# Patient Record
Sex: FEMALE | Race: WHITE | NOT HISPANIC OR LATINO | URBAN - NONMETROPOLITAN AREA
[De-identification: names, ages, dates, MRNs, and addresses within clinical notes are randomized per-mention and may not be internally consistent; named-entity substitution may affect disease eponyms.]

---

## 2018-08-02 PROBLEM — H25.13: Noted: 2018-08-02

## 2018-08-02 PROBLEM — H16.223: Noted: 2018-08-02

## 2018-08-02 PROBLEM — G43.B1: Noted: 2018-02-09

## 2018-08-02 PROBLEM — H52.4: Noted: 2018-08-02

## 2018-08-02 PROBLEM — H52.13: Noted: 2018-08-02

## 2018-08-02 PROBLEM — H43.813: Noted: 2018-02-09

## 2019-08-09 ENCOUNTER — IMPORTED ENCOUNTER (OUTPATIENT)
Dept: URBAN - NONMETROPOLITAN AREA CLINIC 1 | Facility: CLINIC | Age: 54
End: 2019-08-09

## 2019-08-09 PROCEDURE — 92310 CONTACT LENS FITTING OU: CPT

## 2019-08-09 PROCEDURE — 92015 DETERMINE REFRACTIVE STATE: CPT

## 2019-08-09 PROCEDURE — 92014 COMPRE OPH EXAM EST PT 1/>: CPT

## 2019-08-09 NOTE — PATIENT DISCUSSION
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 -  Oculus 5M done previously:Tear Meniscus OU: lowTBUT: OD 3.82 OS 3. 02Patient has incomplete blinks but good meibomian glands-  Patient reports recent diagnosis of possible Sjogren's Dz but testing to confirm has not been completed yet. -  Continue Systane or Refresh OU QID+ PRN. -  Recommended GenTeal Gel OU QHS last visit but not currently using. -  Hx of Pred Forte use but patient states once she starts to taper off her symptoms flare back up. However I do not want her to use this long term due to side effects. -  Continue Xiidra OU QHS -  Continue Alrex BID -  Continue to monitor  Ocular Allergies OU-  Discussed diagnosis in detail with patient -  Asymptomatic at this time. -  Continue Optivar OU BID PRN. -  Continue to monitor PVD OU: -  Discussed findings of exam in detail with the patient.-  No holes/tear/detachments noted on exam today flat 360 OU. -  The risk of retinal detachment in patients with PVDs was discussed with the patient and the warning signs of retinal detachment were carefully reviewed with the patient. -  The patient was warned to return to the office or contact the ophthalmologist on call immediately if they experience signs of retinal detachment or changes noted in vision from today. -  Continue to monitor Note: Patient is currently on a Lung transplant list; 's Notes: MR  7/31/2017DFEOCTOptos 6/10/14DES Tx________________BlephExOculus 3/1/2017PlugsAlrex OU BID - [id number] retry 3/2018 due to dryness and not wanting to use Pred long termOptivar OU BID - 2015-8/2016Refresh PF OU TID - 8/2016 to nowPred Forte gtts x 4 weeks - 2/2016

## 2020-08-25 ENCOUNTER — IMPORTED ENCOUNTER (OUTPATIENT)
Dept: URBAN - NONMETROPOLITAN AREA CLINIC 1 | Facility: CLINIC | Age: 55
End: 2020-08-25

## 2020-08-25 PROBLEM — H16.223: Noted: 2020-08-25

## 2020-08-25 PROBLEM — H52.4: Noted: 2020-10-14

## 2020-08-25 PROBLEM — H16.223: Noted: 2020-10-14

## 2020-08-25 PROBLEM — H52.4: Noted: 2020-08-25

## 2020-08-25 PROBLEM — H43.813: Noted: 2020-08-25

## 2020-08-25 PROBLEM — H25.813: Noted: 2020-08-25

## 2020-08-25 PROBLEM — G43.B1: Noted: 2020-08-25

## 2020-08-25 PROBLEM — H25.813: Noted: 2020-10-14

## 2020-08-25 PROCEDURE — 92310 CONTACT LENS FITTING OU: CPT

## 2020-08-25 PROCEDURE — S0621 ROUTINE OPHTHALMOLOGICAL EXA: HCPCS

## 2020-08-25 NOTE — PATIENT DISCUSSION
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 -  Oculus 5M done previously:Tear Meniscus OU: lowTBUT: OD 3.82 OS 3. 02Patient has incomplete blinks but good meibomian glands-  Patient reports recent diagnosis of possible Sjogren's Dz but testing to confirm has not been completed yet. -  Continue Systane or Refresh OU QID+ PRN. -  Recommended GenTeal Gel OU QHS last visit but not currently using. -  Hx of Pred Forte use but patient states once she starts to taper off her symptoms flare back up. However I do not want her to use this long term due to side effects. -  Continue Xiidra OU QHS -  Continue to monitor  Ocular Allergies OU-  Discussed diagnosis in detail with patient -  Asymptomatic at this time. -  Continue Optivar OU BID PRN. -  Continue to monitor PVD OU: -  Discussed findings of exam in detail with the patient.-  No holes/tear/detachments noted on exam today flat 360 OU. -  The risk of retinal detachment in patients with PVDs was discussed with the patient and the warning signs of retinal detachment were carefully reviewed with the patient. -  The patient was warned to return to the office or contact the ophthalmologist on call immediately if they experience signs of retinal detachment or changes noted in vision from today. -  Continue to monitor Note: Patient is currently on a Lung transplant list; 's Notes: MR  7/31/2017DFEOCTOptos 6/10/14DES Tx________________BlephExOculus 3/1/2017PlugsAlrex OU BID - [id number] retry 3/2018 due to dryness and not wanting to use Pred long termOptivar OU BID - 2015-8/2016Refresh PF OU TID - 8/2016 to nowPred Forte gtts x 4 weeks - 2/2016

## 2020-08-31 ENCOUNTER — IMPORTED ENCOUNTER (OUTPATIENT)
Dept: URBAN - NONMETROPOLITAN AREA CLINIC 1 | Facility: CLINIC | Age: 55
End: 2020-08-31

## 2020-08-31 NOTE — PATIENT DISCUSSION
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 -  Oculus 5M done previously:Tear Meniscus OU: lowTBUT: OD 3.82 OS 3. 02Patient has incomplete blinks but good meibomian glands-  Patient reports recent diagnosis of possible Sjogren's Dz but testing to confirm has not been completed yet. -  Continue Systane or Refresh OU QID+ PRN. -  Recommended GenTeal Gel OU QHS last visit but not currently using. -  Hx of Pred Forte use but patient states once she starts to taper off her symptoms flare back up. However I do not want her to use this long term due to side effects. -  Continue Xiidra OU QHS -  Continue to monitor  Ocular Allergies OU-  Discussed diagnosis in detail with patient -  Asymptomatic at this time. -  Continue Optivar OU BID PRN. -  Continue to monitor PVD OU: -  Discussed findings of exam in detail with the patient.-  No holes/tear/detachments noted on exam today flat 360 OU. -  The risk of retinal detachment in patients with PVDs was discussed with the patient and the warning signs of retinal detachment were carefully reviewed with the patient. -  The patient was warned to return to the office or contact the ophthalmologist on call immediately if they experience signs of retinal detachment or changes noted in vision from today. -  Continue to monitor Note: Patient is currently on a Lung transplant list; 's Notes: MR  7/31/2017DFEOCTOptos 6/10/14DES Tx________________BlephExOculus 3/1/2017PlugsAlrex OU BID - [id number] retry 3/2018 due to dryness and not wanting to use Pred long termOptivar OU BID - 2015-8/2016Refresh PF OU TID - 8/2016 to nowPred Forte gtts x 4 weeks - 2/2016

## 2020-09-09 ENCOUNTER — IMPORTED ENCOUNTER (OUTPATIENT)
Dept: URBAN - NONMETROPOLITAN AREA CLINIC 1 | Facility: CLINIC | Age: 55
End: 2020-09-09

## 2020-09-09 NOTE — PATIENT DISCUSSION
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 -  Oculus 5M done previously:Tear Meniscus OU: lowTBUT: OD 3.82 OS 3. 02Patient has incomplete blinks but good meibomian glands-  Patient reports recent diagnosis of possible Sjogren's Dz but testing to confirm has not been completed yet. -  Continue Systane or Refresh OU QID+ PRN. -  Recommended GenTeal Gel OU QHS last visit but not currently using. -  Hx of Pred Forte use but patient states once she starts to taper off her symptoms flare back up. However I do not want her to use this long term due to side effects. -  Continue Xiidra OU QHS -  Continue to monitor  Ocular Allergies OU-  Discussed diagnosis in detail with patient -  Asymptomatic at this time. -  Continue Optivar OU BID PRN. -  Continue to monitor PVD OU: -  Discussed findings of exam in detail with the patient.-  No holes/tear/detachments noted on exam today flat 360 OU. -  The risk of retinal detachment in patients with PVDs was discussed with the patient and the warning signs of retinal detachment were carefully reviewed with the patient. -  The patient was warned to return to the office or contact the ophthalmologist on call immediately if they experience signs of retinal detachment or changes noted in vision from today. -  Continue to monitor Note: Patient is currently on a Lung transplant list; 's Notes: MR  7/31/2017DFEOCTOptos 6/10/14DES Tx________________BlephExOculus 3/1/2017PlugsAlrex OU BID - [id number] retry 3/2018 due to dryness and not wanting to use Pred long termOptivar OU BID - 2015-8/2016Refresh PF OU TID - 8/2016 to nowPred Forte gtts x 4 weeks - 2/2016

## 2020-09-16 ENCOUNTER — IMPORTED ENCOUNTER (OUTPATIENT)
Dept: URBAN - NONMETROPOLITAN AREA CLINIC 1 | Facility: CLINIC | Age: 55
End: 2020-09-16

## 2020-09-16 NOTE — PATIENT DISCUSSION
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 -  Oculus 5M done previously:Tear Meniscus OU: lowTBUT: OD 3.82 OS 3. 02Patient has incomplete blinks but good meibomian glands-  Patient reports recent diagnosis of possible Sjogren's Dz but testing to confirm has not been completed yet. -  Continue Systane or Refresh OU QID+ PRN. -  Recommended GenTeal Gel OU QHS last visit but not currently using. -  Hx of Pred Forte use but patient states once she starts to taper off her symptoms flare back up. However I do not want her to use this long term due to side effects. -  Continue Xiidra OU QHS -  Continue to monitor  Ocular Allergies OU-  Discussed diagnosis in detail with patient -  Asymptomatic at this time. -  Continue Optivar OU BID PRN. -  Continue to monitor PVD OU: -  Discussed findings of exam in detail with the patient.-  No holes/tear/detachments noted on exam today flat 360 OU. -  The risk of retinal detachment in patients with PVDs was discussed with the patient and the warning signs of retinal detachment were carefully reviewed with the patient. -  The patient was warned to return to the office or contact the ophthalmologist on call immediately if they experience signs of retinal detachment or changes noted in vision from today. -  Continue to monitor Note: Patient is currently on a Lung transplant list; 's Notes: MR  7/31/2017DFEOCTOptos 6/10/14DES Tx________________BlephExOculus 3/1/2017PlugsAlrex OU BID - [id number] retry 3/2018 due to dryness and not wanting to use Pred long termOptivar OU BID - 2015-8/2016Refresh PF OU TID - 8/2016 to nowPred Forte gtts x 4 weeks - 2/2016

## 2020-09-23 ENCOUNTER — IMPORTED ENCOUNTER (OUTPATIENT)
Dept: URBAN - NONMETROPOLITAN AREA CLINIC 1 | Facility: CLINIC | Age: 55
End: 2020-09-23

## 2020-09-23 NOTE — PATIENT DISCUSSION
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 -  Oculus 5M done previously:Tear Meniscus OU: lowTBUT: OD 3.82 OS 3. 02Patient has incomplete blinks but good meibomian glands-  Patient reports recent diagnosis of possible Sjogren's Dz but testing to confirm has not been completed yet. -  Continue Systane or Refresh OU QID+ PRN. -  Recommended GenTeal Gel OU QHS last visit but not currently using. -  Hx of Pred Forte use but patient states once she starts to taper off her symptoms flare back up. However I do not want her to use this long term due to side effects. -  Continue Xiidra OU QHS -  Continue to monitor  Ocular Allergies OU-  Discussed diagnosis in detail with patient -  Asymptomatic at this time. -  Continue Optivar OU BID PRN. -  Continue to monitor PVD OU: -  Discussed findings of exam in detail with the patient.-  No holes/tear/detachments noted on exam today flat 360 OU. -  The risk of retinal detachment in patients with PVDs was discussed with the patient and the warning signs of retinal detachment were carefully reviewed with the patient. -  The patient was warned to return to the office or contact the ophthalmologist on call immediately if they experience signs of retinal detachment or changes noted in vision from today. -  Continue to monitor Note: Patient is currently on a Lung transplant list; 's Notes: MR  7/31/2017DFEOCTOptos 6/10/14DES Tx________________BlephExOculus 3/1/2017PlugsAlrex OU BID - [id number] retry 3/2018 due to dryness and not wanting to use Pred long termOptivar OU BID - 2015-8/2016Refresh PF OU TID - 8/2016 to nowPred Forte gtts x 4 weeks - 2/2016 povidone iodine (if allergic to chlorhexidine)

## 2020-09-30 ENCOUNTER — IMPORTED ENCOUNTER (OUTPATIENT)
Dept: URBAN - NONMETROPOLITAN AREA CLINIC 1 | Facility: CLINIC | Age: 55
End: 2020-09-30

## 2020-09-30 NOTE — PATIENT DISCUSSION
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 -  Oculus 5M done previously:Tear Meniscus OU: lowTBUT: OD 3.82 OS 3. 02Patient has incomplete blinks but good meibomian glands-  Patient reports recent diagnosis of possible Sjogren's Dz but testing to confirm has not been completed yet. -  Continue Systane or Refresh OU QID+ PRN. -  Recommended GenTeal Gel OU QHS last visit but not currently using. -  Hx of Pred Forte use but patient states once she starts to taper off her symptoms flare back up. However I do not want her to use this long term due to side effects. -  Continue Xiidra OU QHS -  Continue to monitor  Ocular Allergies OU-  Discussed diagnosis in detail with patient -  Asymptomatic at this time. -  Continue Optivar OU BID PRN. -  Continue to monitor PVD OU: -  Discussed findings of exam in detail with the patient.-  No holes/tear/detachments noted on exam today flat 360 OU. -  The risk of retinal detachment in patients with PVDs was discussed with the patient and the warning signs of retinal detachment were carefully reviewed with the patient. -  The patient was warned to return to the office or contact the ophthalmologist on call immediately if they experience signs of retinal detachment or changes noted in vision from today. -  Continue to monitor Note: Patient is currently on a Lung transplant list; 's Notes: MR  7/31/2017DFEOCTOptos 6/10/14DES Tx________________BlephExOculus 3/1/2017PlugsAlrex OU BID - [id number] retry 3/2018 due to dryness and not wanting to use Pred long termOptivar OU BID - 2015-8/2016Refresh PF OU TID - 8/2016 to nowPred Forte gtts x 4 weeks - 2/2016

## 2020-10-14 ENCOUNTER — IMPORTED ENCOUNTER (OUTPATIENT)
Dept: URBAN - NONMETROPOLITAN AREA CLINIC 1 | Facility: CLINIC | Age: 55
End: 2020-10-14

## 2020-10-14 PROCEDURE — 99213 OFFICE O/P EST LOW 20 MIN: CPT

## 2020-10-14 NOTE — PATIENT DISCUSSION
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 - Oculus 5M done previously:Tear Meniscus OU: lowTBUT: OD 3.82 OS 3. 02Patient has incomplete blinks but good meibomian glands- Patient reports recent diagnosis of possible Sjogren's Dz but testing to confirm has not been completed yet. - Continue Systane or Refresh OU QID+ PRN. - Recommended GenTeal Gel OU QHS last visit but not currently using. - Hx of Pred Forte use but patient states once she starts to taper off her symptoms flare back up. However I do not want her to use this long term due to side effects. - Continue Xiidra OU QHS - D/C CLS - Start Alrex TID OU x 1 week- Corneal Topography done today and review with patient  - Continue to monitor  Myopia / Presbyopia - Discussed diagnosis in detail with patient - New glasses RX given today and will have patient work with Oh Velasco with CLS - Continue to monitorCataracts OU early signs: -  Discussed diagnosis in detail with patient-  Discussed signs and symptoms associated-  Recommend UV protection -  Progression noted today OU-  Continue to monitor Ocular Allergies OU-  Discussed diagnosis in detail with patient -  Asymptomatic at this time. -  Continue Optivar OU BID PRN. -  Continue to monitor PVD OU: -  Discussed findings of exam in detail with the patient.-  No holes/tear/detachments noted on exam today flat 360 OU. -  The risk of retinal detachment in patients with PVDs was discussed with the patient and the warning signs of retinal detachment were carefully reviewed with the patient. -  The patient was warned to return to the office or contact the ophthalmologist on call immediately if they experience signs of retinal detachment or changes noted in vision from today. -  Continue to monitor Note: Patient is currently on a Lung transplant list; 's Notes: MR  7/31/2017DFEOCTOptos 6/10/14DES Tx________________BlephExOculus 3/1/2017PlugsAlrex OU BID - [id number] retry 3/2018 due to dryness and not wanting to use Pred long termOptivar OU BID - 2015-8/2016Refresh PF OU TID - 8/2016 to nowPred Forte gtts x 4 weeks - 2/2016

## 2020-10-19 ENCOUNTER — IMPORTED ENCOUNTER (OUTPATIENT)
Dept: URBAN - NONMETROPOLITAN AREA CLINIC 1 | Facility: CLINIC | Age: 55
End: 2020-10-19

## 2020-10-19 NOTE — PATIENT DISCUSSION
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 - Oculus 5M done previously:Tear Meniscus OU: lowTBUT: OD 3.82 OS 3. 02Patient has incomplete blinks but good meibomian glands- Patient reports recent diagnosis of possible Sjogren's Dz but testing to confirm has not been completed yet. - Continue Systane or Refresh OU QID+ PRN. - Recommended GenTeal Gel OU QHS last visit but not currently using. - Hx of Pred Forte use but patient states once she starts to taper off her symptoms flare back up. However I do not want her to use this long term due to side effects. - Continue Xiidra OU QHS - D/C CLS - Start Alrex TID OU x 1 week- Corneal Topography done today and review with patient  - Continue to monitor  Myopia / Presbyopia - Discussed diagnosis in detail with patient - New glasses RX given today and will have patient work with Cl Diogenes with CLS - Continue to monitorCataracts OU early signs: -  Discussed diagnosis in detail with patient-  Discussed signs and symptoms associated-  Recommend UV protection -  Progression noted today OU-  Continue to monitor Ocular Allergies OU-  Discussed diagnosis in detail with patient -  Asymptomatic at this time. -  Continue Optivar OU BID PRN. -  Continue to monitor PVD OU: -  Discussed findings of exam in detail with the patient.-  No holes/tear/detachments noted on exam today flat 360 OU. -  The risk of retinal detachment in patients with PVDs was discussed with the patient and the warning signs of retinal detachment were carefully reviewed with the patient. -  The patient was warned to return to the office or contact the ophthalmologist on call immediately if they experience signs of retinal detachment or changes noted in vision from today. -  Continue to monitor Note: Patient is currently on a Lung transplant list; 's Notes: MR  7/31/2017DFEOCTOptos 6/10/14DES Tx________________BlephExOculus 3/1/2017PlugsAlrex OU BID - [id number] retry 3/2018 due to dryness and not wanting to use Pred long termOptivar OU BID - 2015-8/2016Refresh PF OU TID - 8/2016 to nowPred Forte gtts x 4 weeks - 2/2016

## 2020-12-15 ENCOUNTER — IMPORTED ENCOUNTER (OUTPATIENT)
Dept: URBAN - NONMETROPOLITAN AREA CLINIC 1 | Facility: CLINIC | Age: 55
End: 2020-12-15

## 2020-12-15 PROBLEM — H43.813: Noted: 2020-12-15

## 2020-12-15 PROBLEM — H25.813: Noted: 2020-12-15

## 2020-12-15 PROBLEM — H16.223: Noted: 2020-12-15

## 2020-12-15 PROBLEM — G43.B1: Noted: 2020-12-15

## 2020-12-15 PROBLEM — H52.4: Noted: 2020-12-15

## 2020-12-15 PROCEDURE — 99214 OFFICE O/P EST MOD 30 MIN: CPT

## 2020-12-15 PROCEDURE — 92250 FUNDUS PHOTOGRAPHY W/I&R: CPT

## 2020-12-15 NOTE — PATIENT DISCUSSION
PVD OU (OS new finding)- Discussed findings of exam in detail with the patient. - The risk of retinal detachment in patients with PVDs was discussed with the patient and the warning signs of retinal detachment were carefully reviewed with the patient. - The patient was warned to return to the office or contact the ophthalmologist on call immediately if they experience signs of retinal detachment. 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 - Oculus 5M done previously:Tear Meniscus OU: lowTBUT: OD 3.82 OS 3. 02Patient has incomplete blinks but good meibomian glands- Patient reports recent diagnosis of possible Sjogren's Dz but testing to confirm has not been completed yet. - Continue Systane or Refresh OU QID+ PRN. - Recommended GenTeal Gel OU QHS last visit but not currently using. - Hx of Pred Forte use but patient states once she starts to taper off her symptoms flare back up. However I do not want her to use this long term due to side effects. - Continue Xiidra OU QHS - D/C CLS - Start Alrex TID OU x 1 week- Corneal Topography done today and review with patient  - Continue to monitor  Myopia / Presbyopia - Discussed diagnosis in detail with patient - New glasses RX given today and will have patient work with [place] with CLS - Continue to monitorCataracts OU early signs: -  Discussed diagnosis in detail with patient-  Discussed signs and symptoms associated-  Recommend UV protection -  Progression noted today OU-  Continue to monitor Ocular Allergies OU-  Discussed diagnosis in detail with patient -  Asymptomatic at this time. -  Continue Optivar OU BID PRN.  -  Continue to monitor Note: Patient is currently on a Lung transplant list; 's Notes: MR  7/31/2017DFEOCTOptos 6/10/14DES Tx________________BlephExOculus 3/1/2017PlugsAlrex OU BID - [id number] retry 3/2018 due to dryness and not wanting to use Pred long termOptivar OU BID - 2015-8/2016Refresh PF OU TID - 8/2016 to nowPred Forte gtts x 4 weeks - 2/2016

## 2021-02-08 ENCOUNTER — IMPORTED ENCOUNTER (OUTPATIENT)
Dept: URBAN - NONMETROPOLITAN AREA CLINIC 1 | Facility: CLINIC | Age: 56
End: 2021-02-08

## 2021-02-08 PROCEDURE — 99214 OFFICE O/P EST MOD 30 MIN: CPT

## 2021-02-08 PROCEDURE — 92250 FUNDUS PHOTOGRAPHY W/I&R: CPT

## 2021-02-08 NOTE — PATIENT DISCUSSION
PVD OU - Discussed findings of exam in detail with the patient. - The risk of retinal detachment in patients with PVDs was discussed with the patient and the warning signs of retinal detachment were carefully reviewed with the patient. - The patient was warned to return to the office or contact the ophthalmologist on call immediately if they experience signs of retinal detachment. - Optos done today no holes tears or detachments. 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 - Oculus 5M done previously:Tear Meniscus OU: lowTBUT: OD 3.82 OS 3. 02Patient has incomplete blinks but good meibomian glands- Patient reports recent diagnosis of possible Sjogren's Dz but testing to confirm has not been completed yet. - Continue Systane or Refresh OU QID+ PRN. - Recommended GenTeal Gel OU QHS last visit but not currently using. - Hx of Pred Forte use but patient states once she starts to taper off her symptoms flare back up. However I do not want her to use this long term due to side effects. - Continue Xiidra OU QHS - D/C CLS - Start Alrex TID OU x 1 week- Corneal Topography done today and review with patient  - Continue to monitor  Myopia / Presbyopia - Discussed diagnosis in detail with patient - New glasses RX given today and will have patient work with Jessie Arroyo with CLS - Continue to monitorCataracts OU early signs: -  Discussed diagnosis in detail with patient-  Discussed signs and symptoms associated-  Recommend UV protection -  Progression noted today OU-  Continue to monitor Ocular Allergies OU-  Discussed diagnosis in detail with patient -  Asymptomatic at this time. -  Continue Optivar OU BID PRN.  -  Continue to monitor Note: Patient is currently on a Lung transplant list; 's Notes: MR  7/31/2017DFEOCTOptos 2/8/21DES Tx________________BlephExOculus 3/1/2017PlugsAlrex OU BID - [id number] retry 3/2018 due to dryness and not wanting to use Pred long termOptivar OU BID - 2015-8/2016Refresh PF OU TID - 8/2016 to nowPred Forte gtts x 4 weeks - 2/2016

## 2021-08-26 ENCOUNTER — IMPORTED ENCOUNTER (OUTPATIENT)
Dept: URBAN - NONMETROPOLITAN AREA CLINIC 1 | Facility: CLINIC | Age: 56
End: 2021-08-26

## 2021-08-26 PROBLEM — H52.4: Noted: 2021-08-26

## 2021-08-26 PROBLEM — H16.223: Noted: 2021-08-26

## 2021-08-26 PROBLEM — H25.813: Noted: 2021-08-26

## 2021-08-26 PROBLEM — H43.813: Noted: 2021-08-26

## 2021-08-26 PROBLEM — G43.B1: Noted: 2020-12-15

## 2021-08-26 PROCEDURE — 92015 DETERMINE REFRACTIVE STATE: CPT

## 2021-08-26 PROCEDURE — 92310 CONTACT LENS FITTING OU: CPT

## 2021-08-26 PROCEDURE — 92014 COMPRE OPH EXAM EST PT 1/>: CPT

## 2021-08-26 NOTE — PATIENT DISCUSSION
Myopia / Presbyopia - Discussed diagnosis in detail with patient - New glasses and CL RX given today will speak to Nicole to see if their is anything we can do with CLS to improve vision  - Continue to monitorCataracts OU early signs: - Discussed diagnosis in detail with patient- Discussed signs and symptoms associated- Recommend UV protection - Progression noted today OU- Continue to monitor PVD OU - Discussed findings of exam in detail with the patient. - The risk of retinal detachment in patients with PVDs was discussed with the patient and the warning signs of retinal detachment were carefully reviewed with the patient. - The patient was warned to return to the office or contact the ophthalmologist on call immediately if they experience signs of retinal detachment. - Optos done previously no holes tears or detachments. - Continue to monitorSuperficial Punctate Keratitis/Dry Eyes OU (OD>OS): with Corneal Edema - Discussed diagnosis in detail with patient- Signs/symptoms associated discussed along with FB sensation. - Oculus 5M done previously:Tear Meniscus OU: lowTBUT: OD 3.82 OS 3. 02Patient has incomplete blinks but good meibomian glands- Patient reports recent diagnosis of possible Sjogren's Dz but testing to confirm has not been completed yet. - Continue Systane or Refresh OU QID+ PRN. - Recommended GenTeal Gel OU QHS last visit but not currently using. - Hx of Pred Forte use but patient states once she starts to taper off her symptoms flare back up. However I do not want her to use this long term due to side effects. - Continue Xiidra OU QHS - Corneal Topography done previously and review with patient  - Continue to monitor  Ocular Allergies OU-  Discussed diagnosis in detail with patient -  Asymptomatic at this time. -  Continue Optivar OU BID PRN.  -  Continue to monitor Note: Patient is currently on a Lung transplant list; 's Notes: MR  7/31/2017DFEOCTOptos 2/8/21DES Tx________________BlephExOculus 3/1/2017PlugsAlrex OU BID - 2260-6811 retry 3/2018 due to dryness and not wanting to use Pred long termOptivar OU BID - 2015-8/2016Refresh PF OU TID - 8/2016 to nowPred Forte gtts x 4 weeks - 2/2016

## 2022-04-09 ASSESSMENT — VISUAL ACUITY
OS_SC: 20/30
OS_SC: 20/30
OD_SC: J1
OD_SC: J1
OS_GLARE: 20/20
OS_SC: 20/30
OS_SC: J1
OS_SC: J1
OD_SC: 20/30-1
OS_SC: 20/29-
OS_SC: J1
OS_SC: 20/30
OS_CC: J1
OS_SC: 20/25
OS_SC: J1
OS_SC: J1
OS_SC: 20/20
OS_SC: J1
OD_SC: 20/22+1
OD_SC: 20/30-1
OD_SC: 20/30-1
OS_SC: 20/30
OD_SC: J1
OD_SC: 20/30-1
OD_SC: 20/20
OS_SC: 20/30
OD_SC: 20/30-1
OD_SC: 20/22-1
OD_SC: J1
OD_SC: 20/22-1
OD_SC: J1
OS_SC: 20/30
OS_SC: J1
OD_SC: 20/30-1
OD_SC: 20/30-1
OS_SC: 20/25+1
OD_CC: J1
OS_SC: 20/30
OD_SC: J1
OD_SC: 20/25
OS_SC: J1
OS_SC: 20/29-2
OS_SC: J1
OD_GLARE: 20/25
OD_SC: 20/30-1
OD_SC: J1
OU_CC: 20/30

## 2022-04-09 ASSESSMENT — TONOMETRY
OS_IOP_MMHG: 14
OD_IOP_MMHG: 17
OD_IOP_MMHG: 15
OS_IOP_MMHG: 14
OD_IOP_MMHG: 17
OS_IOP_MMHG: 17
OS_IOP_MMHG: 15
OS_IOP_MMHG: 15
OD_IOP_MMHG: 15
OD_IOP_MMHG: 14
OD_IOP_MMHG: 15
OS_IOP_MMHG: 17

## 2022-08-29 ENCOUNTER — ESTABLISHED PATIENT (OUTPATIENT)
Dept: URBAN - NONMETROPOLITAN AREA CLINIC 1 | Facility: CLINIC | Age: 57
End: 2022-08-29

## 2022-08-29 DIAGNOSIS — H52.4: ICD-10-CM

## 2022-08-29 PROCEDURE — 92310-E CONTACT LENS FITTING ESTABLISH PATIENT

## 2022-08-29 PROCEDURE — S0621 ROUTINE OPHTHALMOLOGICAL EXA: HCPCS

## 2022-08-29 ASSESSMENT — VISUAL ACUITY
OS_CC: 20/40
OS_PH: 20/20-2
OD_PH: 20/20-2
OD_CC: 20/40+2

## 2022-08-29 ASSESSMENT — TONOMETRY
OS_IOP_MMHG: 15
OD_IOP_MMHG: 13

## 2022-08-29 NOTE — PATIENT DISCUSSION
Discussed diagnosis in detail with patient- Signs/symptoms associated discussed along with FB sensation. Oculus 5M done previously:Tear Meniscus OU: low TBUT: OD 3.82 OS 3. 02Patient has incomplete blinks but good meibomian glands. Patient reports recent diagnosis of possible Sjogren's Dz but testing to confirm has not been completed yet. Continue Systane or Refresh OU QID+ PRN. Recommended GenTeal Gel OU QHS last visit but not currently using. Hx of Pred Forte use but patient states once she starts to taper off her symptoms flare back up. However I do not want her to use this long term due to side effects. Continue Xiidra OU QHS. Corneal Topography done previously and review with patient. Continue to monitor Ocular Allergies OU- Discussed diagnosis in detail with patient. Asymptomatic at this time. Continue Optivar OU BID PRN. Continue to monitor.

## 2022-10-28 ENCOUNTER — CONTACT LENSES/GLASSES VISIT (OUTPATIENT)
Dept: URBAN - NONMETROPOLITAN AREA CLINIC 1 | Facility: CLINIC | Age: 57
End: 2022-10-28

## 2022-10-28 DIAGNOSIS — H52.4: ICD-10-CM

## 2022-10-28 PROCEDURE — 92310-16 CONTACT LENS FITTING- 175

## 2022-10-28 ASSESSMENT — VISUAL ACUITY
OD_PH: 20/30
OS_PH: 20/25-1
OD_CC: 20/60-2
OS_CC: 20/200+2

## 2022-10-28 NOTE — PATIENT DISCUSSION
"CL trials were given last visit, but she comes in today stating that optical gave her another trial of -1.50 OD and she has not been seeing well. She says that optical initially ordered the ""wrong box"" and she had them send it back and got another trial. She did not bring CL boxes in with her today for me to verify what she is actually wearing today. I wonder if she has gotten her lenses in the wrong eye/mixed up. "

## 2022-10-28 NOTE — PATIENT DISCUSSION
New CL trials given today of OS at -3.50 OS. She is to call/text me with the OD information so I have the correct Rx in the computer system.

## 2022-10-28 NOTE — PATIENT DISCUSSION
I have repeated MR myself today and noticed a complete change in GLRx. Went over in detail w/ pt today. She has recently had a an increase in her PYRIDOSTIGMINE BROMIDE from 60mg QID to 180mg QID. Unknown if this is related or not. After detailed slit lamp exam again today I noticed an increase in her cataract growth.

## 2023-08-29 ENCOUNTER — ESTABLISHED PATIENT (OUTPATIENT)
Dept: URBAN - NONMETROPOLITAN AREA CLINIC 1 | Facility: CLINIC | Age: 58
End: 2023-08-29

## 2023-08-29 DIAGNOSIS — H16.223: ICD-10-CM

## 2023-08-29 DIAGNOSIS — H25.813: ICD-10-CM

## 2023-08-29 DIAGNOSIS — H43.813: ICD-10-CM

## 2023-08-29 DIAGNOSIS — H52.4: ICD-10-CM

## 2023-08-29 DIAGNOSIS — G43.B0: ICD-10-CM

## 2023-08-29 DIAGNOSIS — H35.372: ICD-10-CM

## 2023-08-29 PROCEDURE — 92499OP2 OPTOMAP RETINAL SCREENING BOTH EYES

## 2023-08-29 PROCEDURE — 92310-E CONTACT LENS FITTING ESTABLISH PATIENT

## 2023-08-29 PROCEDURE — 92014 COMPRE OPH EXAM EST PT 1/>: CPT

## 2023-08-29 PROCEDURE — 92015 DETERMINE REFRACTIVE STATE: CPT

## 2023-08-29 ASSESSMENT — VISUAL ACUITY
OS_CC: 20/25-2
OD_PH: 20/32
OD_CC: 20/50-2
OU_CC: 20/25-1
OU_CC: 20/30

## 2023-08-29 ASSESSMENT — TONOMETRY
OD_IOP_MMHG: 17
OS_IOP_MMHG: 17

## 2023-12-08 ENCOUNTER — EMERGENCY VISIT (OUTPATIENT)
Dept: URBAN - NONMETROPOLITAN AREA CLINIC 1 | Facility: CLINIC | Age: 58
End: 2023-12-08

## 2023-12-08 DIAGNOSIS — H16.223: ICD-10-CM

## 2023-12-08 PROCEDURE — 99213 OFFICE O/P EST LOW 20 MIN: CPT

## 2023-12-08 ASSESSMENT — TONOMETRY
OS_IOP_MMHG: 14
OD_IOP_MMHG: 14

## 2023-12-08 ASSESSMENT — VISUAL ACUITY
OD_PH: 20/30
OD_CC: 20/40
OS_CC: 20/30

## 2024-01-08 ENCOUNTER — EMERGENCY VISIT (OUTPATIENT)
Dept: URBAN - NONMETROPOLITAN AREA CLINIC 1 | Facility: CLINIC | Age: 59
End: 2024-01-08

## 2024-01-08 DIAGNOSIS — H35.372: ICD-10-CM

## 2024-01-08 DIAGNOSIS — H25.813: ICD-10-CM

## 2024-01-08 PROCEDURE — 99213 OFFICE O/P EST LOW 20 MIN: CPT

## 2024-01-08 PROCEDURE — 92134 CPTRZ OPH DX IMG PST SGM RTA: CPT

## 2024-01-08 ASSESSMENT — VISUAL ACUITY
OS_CC: 20/32-1
OD_CC: 20/40
OD_CC: 20/32
OS_CC: 20/30

## 2024-01-08 ASSESSMENT — TONOMETRY
OD_IOP_MMHG: 18
OS_IOP_MMHG: 18

## 2024-09-13 ENCOUNTER — COMPREHENSIVE EXAM (OUTPATIENT)
Dept: URBAN - NONMETROPOLITAN AREA CLINIC 1 | Facility: CLINIC | Age: 59
End: 2024-09-13

## 2024-09-13 DIAGNOSIS — H52.4: ICD-10-CM

## 2024-09-13 PROCEDURE — 92015 DETERMINE REFRACTIVE STATE: CPT

## 2024-09-13 PROCEDURE — 92014 COMPRE OPH EXAM EST PT 1/>: CPT

## 2024-09-13 PROCEDURE — 92310-E CONTACT LENS FITTING ESTABLISH PATIENT

## 2025-03-18 ENCOUNTER — FOLLOW UP (OUTPATIENT)
Age: 60
End: 2025-03-18

## 2025-03-18 DIAGNOSIS — H35.372: ICD-10-CM

## 2025-03-18 DIAGNOSIS — G43.B0: ICD-10-CM

## 2025-03-18 DIAGNOSIS — H43.813: ICD-10-CM

## 2025-03-18 DIAGNOSIS — H25.813: ICD-10-CM

## 2025-03-18 DIAGNOSIS — H16.223: ICD-10-CM

## 2025-03-18 PROCEDURE — 99214 OFFICE O/P EST MOD 30 MIN: CPT

## 2025-03-18 PROCEDURE — 92134 CPTRZ OPH DX IMG PST SGM RTA: CPT

## 2025-04-30 ENCOUNTER — CONSULTATION/EVALUATION (OUTPATIENT)
Age: 60
End: 2025-04-30

## 2025-06-10 ENCOUNTER — PRE-OP/H&P (OUTPATIENT)
Age: 60
End: 2025-06-10

## 2025-06-10 VITALS
HEIGHT: 66 IN | WEIGHT: 132 LBS | BODY MASS INDEX: 21.21 KG/M2 | HEART RATE: 60 BPM | SYSTOLIC BLOOD PRESSURE: 118 MMHG | DIASTOLIC BLOOD PRESSURE: 74 MMHG

## 2025-06-10 DIAGNOSIS — J84.10: ICD-10-CM

## 2025-06-10 DIAGNOSIS — F41.9: ICD-10-CM

## 2025-06-10 DIAGNOSIS — Z01.818: ICD-10-CM

## 2025-06-10 PROCEDURE — 99213 OFFICE O/P EST LOW 20 MIN: CPT

## 2025-06-17 ENCOUNTER — SURGERY/PROCEDURE (OUTPATIENT)
Age: 60
End: 2025-06-17

## 2025-06-17 DIAGNOSIS — H25.811: ICD-10-CM

## 2025-06-17 PROCEDURE — 66984AV REMOVE CATARACT, INSERT ADVANCED LENS

## 2025-06-17 PROCEDURE — 66999LNX LENSX

## 2025-06-17 PROCEDURE — 99199PAV PROF ADVANCED VISION PACKAGE

## 2025-06-17 PROCEDURE — 65772LRI LRI DURING CAT SX

## 2025-06-18 ENCOUNTER — POST OP/EVAL OF SECOND EYE (OUTPATIENT)
Age: 60
End: 2025-06-18

## 2025-06-18 DIAGNOSIS — H25.812: ICD-10-CM

## 2025-06-18 DIAGNOSIS — Z98.41: ICD-10-CM

## 2025-06-18 PROCEDURE — 99024 POSTOP FOLLOW-UP VISIT: CPT

## 2025-06-24 ENCOUNTER — SURGERY/PROCEDURE (OUTPATIENT)
Age: 60
End: 2025-06-24

## 2025-06-24 DIAGNOSIS — H25.812: ICD-10-CM

## 2025-06-24 PROCEDURE — 99199PAV PROF ADVANCED VISION PACKAGE

## 2025-06-24 PROCEDURE — 66984AV REMOVE CATARACT, INSERT ADVANCED LENS: Mod: 79,LT

## 2025-06-24 PROCEDURE — 65772LRI LRI DURING CAT SX

## 2025-06-24 PROCEDURE — 66999LNX LENSX

## 2025-06-25 ENCOUNTER — POST-OP (OUTPATIENT)
Age: 60
End: 2025-06-25

## 2025-06-25 DIAGNOSIS — Z98.41: ICD-10-CM

## 2025-06-25 PROCEDURE — 99024 POSTOP FOLLOW-UP VISIT: CPT

## 2025-07-02 ENCOUNTER — POST-OP (OUTPATIENT)
Age: 60
End: 2025-07-02

## 2025-07-02 DIAGNOSIS — Z98.41: ICD-10-CM

## 2025-07-02 PROCEDURE — 99024 POSTOP FOLLOW-UP VISIT: CPT
